# Patient Record
Sex: MALE | Race: BLACK OR AFRICAN AMERICAN | NOT HISPANIC OR LATINO | URBAN - METROPOLITAN AREA
[De-identification: names, ages, dates, MRNs, and addresses within clinical notes are randomized per-mention and may not be internally consistent; named-entity substitution may affect disease eponyms.]

---

## 2019-05-24 ENCOUNTER — EMERGENCY (EMERGENCY)
Facility: HOSPITAL | Age: 44
LOS: 1 days | Discharge: HOME | End: 2019-05-24
Attending: EMERGENCY MEDICINE
Payer: COMMERCIAL

## 2019-05-24 VITALS
OXYGEN SATURATION: 98 % | DIASTOLIC BLOOD PRESSURE: 95 MMHG | SYSTOLIC BLOOD PRESSURE: 138 MMHG | HEIGHT: 71 IN | WEIGHT: 184.09 LBS | HEART RATE: 88 BPM | TEMPERATURE: 98 F | RESPIRATION RATE: 16 BRPM

## 2019-05-24 VITALS
RESPIRATION RATE: 17 BRPM | SYSTOLIC BLOOD PRESSURE: 100 MMHG | OXYGEN SATURATION: 99 % | TEMPERATURE: 97 F | DIASTOLIC BLOOD PRESSURE: 67 MMHG

## 2019-05-24 PROCEDURE — 99284 EMERGENCY DEPT VISIT MOD MDM: CPT | Mod: 25

## 2019-05-24 PROCEDURE — 76870 US EXAM SCROTUM: CPT

## 2019-05-24 PROCEDURE — 99284 EMERGENCY DEPT VISIT MOD MDM: CPT

## 2019-05-24 PROCEDURE — 76870 US EXAM SCROTUM: CPT | Mod: 26

## 2019-05-24 RX ORDER — IBUPROFEN 200 MG
600 TABLET ORAL ONCE
Refills: 0 | Status: COMPLETED | OUTPATIENT
Start: 2019-05-24 | End: 2019-05-24
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
## 2019-05-24 NOTE — ED PROVIDER NOTE - PROGRESS NOTE DETAILS
Testicular US unremarkable, no e/o trauma, only mild hydrocele.  Pt given report.  Advised strict return precautions and PMD f/u.

## 2019-05-24 NOTE — ED ADULT NURSE NOTE - NSIMPLEMENTINTERV_GEN_ALL_ED
Implemented All Universal Safety Interventions:  [place] to call system. Call bell, personal items and telephone within reach. Instruct patient to call for assistance. Room bathroom lighting operational. Non-slip footwear when patient is off stretcher. Physically safe environment: no spills, clutter or unnecessary equipment. Stretcher in lowest position, wheels locked, appropriate side rails in place.

## 2020-01-01 ENCOUNTER — TRANSCRIPTION ENCOUNTER (OUTPATIENT)
Age: 45
End: 2020-01-01

## 2021-02-14 ENCOUNTER — TRANSCRIPTION ENCOUNTER (OUTPATIENT)
Age: 46
End: 2021-02-14

## 2023-09-18 ENCOUNTER — INPATIENT (INPATIENT)
Facility: HOSPITAL | Age: 48
LOS: 0 days | Discharge: HOME | DRG: 309 | End: 2023-09-19
Attending: STUDENT IN AN ORGANIZED HEALTH CARE EDUCATION/TRAINING PROGRAM | Admitting: STUDENT IN AN ORGANIZED HEALTH CARE EDUCATION/TRAINING PROGRAM
Payer: COMMERCIAL

## 2023-09-18 VITALS
TEMPERATURE: 98 F | HEIGHT: 70.87 IN | HEART RATE: 110 BPM | RESPIRATION RATE: 19 BRPM | DIASTOLIC BLOOD PRESSURE: 92 MMHG | SYSTOLIC BLOOD PRESSURE: 168 MMHG | WEIGHT: 211.64 LBS | OXYGEN SATURATION: 98 %

## 2023-09-18 PROCEDURE — 93010 ELECTROCARDIOGRAM REPORT: CPT

## 2023-09-18 PROCEDURE — 99285 EMERGENCY DEPT VISIT HI MDM: CPT

## 2023-09-18 RX ORDER — SODIUM CHLORIDE 9 MG/ML
1000 INJECTION INTRAMUSCULAR; INTRAVENOUS; SUBCUTANEOUS ONCE
Refills: 0 | Status: COMPLETED | OUTPATIENT
Start: 2023-09-18 | End: 2023-09-18

## 2023-09-18 NOTE — ED PROVIDER NOTE - OBJECTIVE STATEMENT
47-year-old male, PMH of HTN, (not currently prescribed any medications) presenting with palpitations.  Patient reports symptoms began approximately 1 hour ago.  Feels like his heart is racing and has some chest pain but denies any dizziness, lightheadedness or trouble breathing.  Reports he had a similar episode years ago when he first got the COVID-vaccine but it did not last this long and was not as severe.

## 2023-09-18 NOTE — ED PROVIDER NOTE - PROGRESS NOTE DETAILS
Boy CRUZ: Received sign out from Dr. MAXI Le.  labs wnl.  MAR and hospitalist endorsed. Pt agrees with admission for cardio consult.  I had a detailed discussion with the patient and/or guardian regarding the historical points, exam findings, and any diagnostic results supporting the admit diagnosis.

## 2023-09-18 NOTE — ED PROVIDER NOTE - PHYSICAL EXAMINATION
General: well appearing male, no acute distress   HEENT: normocephalic, atraumatic   Respiratory: normal work of breathing  Cardiac: tachycardic, irregularly irregular    Abdomen: soft, non-tender, no guarding or rebound   MSK: no swelling or tenderness of lower extremities, moving all extremities spontaneously   Skin: warm, dry   Neuro: A&Ox3  Psych: appropriate affect

## 2023-09-18 NOTE — ED PROVIDER NOTE - CLINICAL SUMMARY MEDICAL DECISION MAKING FREE TEXT BOX
47-year-old male presenting with chest pain and palpitations.  Patient denies any recent illnesses and has been eating and drinking normally.  EKG shows A-fib.  Symptoms likely secondary to new onset atrial fibrillation.  Will get labs to assess for endorgan damage.

## 2023-09-19 ENCOUNTER — TRANSCRIPTION ENCOUNTER (OUTPATIENT)
Age: 48
End: 2023-09-19

## 2023-09-19 VITALS
SYSTOLIC BLOOD PRESSURE: 138 MMHG | HEART RATE: 65 BPM | OXYGEN SATURATION: 97 % | DIASTOLIC BLOOD PRESSURE: 90 MMHG | TEMPERATURE: 98 F | RESPIRATION RATE: 18 BRPM

## 2023-09-19 DIAGNOSIS — I48.91 UNSPECIFIED ATRIAL FIBRILLATION: ICD-10-CM

## 2023-09-19 DIAGNOSIS — I10 ESSENTIAL (PRIMARY) HYPERTENSION: ICD-10-CM

## 2023-09-19 DIAGNOSIS — Z29.9 ENCOUNTER FOR PROPHYLACTIC MEASURES, UNSPECIFIED: ICD-10-CM

## 2023-09-19 DIAGNOSIS — N17.9 ACUTE KIDNEY FAILURE, UNSPECIFIED: ICD-10-CM

## 2023-09-19 LAB
A1C WITH ESTIMATED AVERAGE GLUCOSE RESULT: 5.5 % — SIGNIFICANT CHANGE UP (ref 4–5.6)
ALBUMIN SERPL ELPH-MCNC: 3.6 G/DL — SIGNIFICANT CHANGE UP (ref 3.5–5)
ALBUMIN SERPL ELPH-MCNC: 3.6 G/DL — SIGNIFICANT CHANGE UP (ref 3.5–5)
ALP SERPL-CCNC: 81 U/L — SIGNIFICANT CHANGE UP (ref 40–120)
ALP SERPL-CCNC: 83 U/L — SIGNIFICANT CHANGE UP (ref 40–120)
ALT FLD-CCNC: 42 U/L DA — SIGNIFICANT CHANGE UP (ref 10–60)
ALT FLD-CCNC: 42 U/L DA — SIGNIFICANT CHANGE UP (ref 10–60)
AMPHET UR-MCNC: NEGATIVE — SIGNIFICANT CHANGE UP
ANION GAP SERPL CALC-SCNC: 7 MMOL/L — SIGNIFICANT CHANGE UP (ref 5–17)
ANION GAP SERPL CALC-SCNC: 8 MMOL/L — SIGNIFICANT CHANGE UP (ref 5–17)
APPEARANCE UR: CLEAR — SIGNIFICANT CHANGE UP
AST SERPL-CCNC: 28 U/L — SIGNIFICANT CHANGE UP (ref 10–40)
AST SERPL-CCNC: 33 U/L — SIGNIFICANT CHANGE UP (ref 10–40)
BARBITURATES UR SCN-MCNC: NEGATIVE — SIGNIFICANT CHANGE UP
BASOPHILS # BLD AUTO: 0.01 K/UL — SIGNIFICANT CHANGE UP (ref 0–0.2)
BASOPHILS NFR BLD AUTO: 0.2 % — SIGNIFICANT CHANGE UP (ref 0–2)
BENZODIAZ UR-MCNC: NEGATIVE — SIGNIFICANT CHANGE UP
BILIRUB SERPL-MCNC: 0.6 MG/DL — SIGNIFICANT CHANGE UP (ref 0.2–1.2)
BILIRUB SERPL-MCNC: 0.6 MG/DL — SIGNIFICANT CHANGE UP (ref 0.2–1.2)
BILIRUB UR-MCNC: NEGATIVE — SIGNIFICANT CHANGE UP
BUN SERPL-MCNC: 15 MG/DL — SIGNIFICANT CHANGE UP (ref 7–18)
BUN SERPL-MCNC: 16 MG/DL — SIGNIFICANT CHANGE UP (ref 7–18)
CALCIUM SERPL-MCNC: 8.8 MG/DL — SIGNIFICANT CHANGE UP (ref 8.4–10.5)
CALCIUM SERPL-MCNC: 8.9 MG/DL — SIGNIFICANT CHANGE UP (ref 8.4–10.5)
CHLORIDE SERPL-SCNC: 107 MMOL/L — SIGNIFICANT CHANGE UP (ref 96–108)
CHLORIDE SERPL-SCNC: 107 MMOL/L — SIGNIFICANT CHANGE UP (ref 96–108)
CHOLEST SERPL-MCNC: 185 MG/DL — SIGNIFICANT CHANGE UP
CO2 SERPL-SCNC: 25 MMOL/L — SIGNIFICANT CHANGE UP (ref 22–31)
CO2 SERPL-SCNC: 28 MMOL/L — SIGNIFICANT CHANGE UP (ref 22–31)
COCAINE METAB.OTHER UR-MCNC: NEGATIVE — SIGNIFICANT CHANGE UP
COLOR SPEC: YELLOW — SIGNIFICANT CHANGE UP
CREAT ?TM UR-MCNC: 93 MG/DL — SIGNIFICANT CHANGE UP
CREAT SERPL-MCNC: 1.42 MG/DL — HIGH (ref 0.5–1.3)
CREAT SERPL-MCNC: 1.43 MG/DL — HIGH (ref 0.5–1.3)
DIFF PNL FLD: NEGATIVE — SIGNIFICANT CHANGE UP
EGFR: 62 ML/MIN/1.73M2 — SIGNIFICANT CHANGE UP
EGFR: 62 ML/MIN/1.73M2 — SIGNIFICANT CHANGE UP
EOSINOPHIL # BLD AUTO: 0.09 K/UL — SIGNIFICANT CHANGE UP (ref 0–0.5)
EOSINOPHIL NFR BLD AUTO: 2.1 % — SIGNIFICANT CHANGE UP (ref 0–6)
ESTIMATED AVERAGE GLUCOSE: 111 MG/DL — SIGNIFICANT CHANGE UP (ref 68–114)
GLUCOSE SERPL-MCNC: 110 MG/DL — HIGH (ref 70–99)
GLUCOSE SERPL-MCNC: 111 MG/DL — HIGH (ref 70–99)
GLUCOSE UR QL: NEGATIVE MG/DL — SIGNIFICANT CHANGE UP
HCT VFR BLD CALC: 40.7 % — SIGNIFICANT CHANGE UP (ref 39–50)
HCT VFR BLD CALC: 41.5 % — SIGNIFICANT CHANGE UP (ref 39–50)
HDLC SERPL-MCNC: 60 MG/DL — SIGNIFICANT CHANGE UP
HGB BLD-MCNC: 13.9 G/DL — SIGNIFICANT CHANGE UP (ref 13–17)
HGB BLD-MCNC: 14.3 G/DL — SIGNIFICANT CHANGE UP (ref 13–17)
IMM GRANULOCYTES NFR BLD AUTO: 0.2 % — SIGNIFICANT CHANGE UP (ref 0–0.9)
KETONES UR-MCNC: NEGATIVE MG/DL — SIGNIFICANT CHANGE UP
LEUKOCYTE ESTERASE UR-ACNC: NEGATIVE — SIGNIFICANT CHANGE UP
LIDOCAIN IGE QN: 95 U/L — HIGH (ref 13–75)
LIPID PNL WITH DIRECT LDL SERPL: 112 MG/DL — HIGH
LYMPHOCYTES # BLD AUTO: 1.01 K/UL — SIGNIFICANT CHANGE UP (ref 1–3.3)
LYMPHOCYTES # BLD AUTO: 23.7 % — SIGNIFICANT CHANGE UP (ref 13–44)
MAGNESIUM SERPL-MCNC: 2 MG/DL — SIGNIFICANT CHANGE UP (ref 1.6–2.6)
MAGNESIUM SERPL-MCNC: 2 MG/DL — SIGNIFICANT CHANGE UP (ref 1.6–2.6)
MCHC RBC-ENTMCNC: 29.2 PG — SIGNIFICANT CHANGE UP (ref 27–34)
MCHC RBC-ENTMCNC: 29.2 PG — SIGNIFICANT CHANGE UP (ref 27–34)
MCHC RBC-ENTMCNC: 34.2 GM/DL — SIGNIFICANT CHANGE UP (ref 32–36)
MCHC RBC-ENTMCNC: 34.5 GM/DL — SIGNIFICANT CHANGE UP (ref 32–36)
MCV RBC AUTO: 84.9 FL — SIGNIFICANT CHANGE UP (ref 80–100)
MCV RBC AUTO: 85.5 FL — SIGNIFICANT CHANGE UP (ref 80–100)
METHADONE UR-MCNC: NEGATIVE — SIGNIFICANT CHANGE UP
MONOCYTES # BLD AUTO: 0.41 K/UL — SIGNIFICANT CHANGE UP (ref 0–0.9)
MONOCYTES NFR BLD AUTO: 9.6 % — SIGNIFICANT CHANGE UP (ref 2–14)
NEUTROPHILS # BLD AUTO: 2.74 K/UL — SIGNIFICANT CHANGE UP (ref 1.8–7.4)
NEUTROPHILS NFR BLD AUTO: 64.2 % — SIGNIFICANT CHANGE UP (ref 43–77)
NITRITE UR-MCNC: NEGATIVE — SIGNIFICANT CHANGE UP
NON HDL CHOLESTEROL: 125 MG/DL — SIGNIFICANT CHANGE UP
NRBC # BLD: 0 /100 WBCS — SIGNIFICANT CHANGE UP (ref 0–0)
NRBC # BLD: 0 /100 WBCS — SIGNIFICANT CHANGE UP (ref 0–0)
OPIATES UR-MCNC: NEGATIVE — SIGNIFICANT CHANGE UP
PCP SPEC-MCNC: SIGNIFICANT CHANGE UP
PCP UR-MCNC: NEGATIVE — SIGNIFICANT CHANGE UP
PH UR: 6.5 — SIGNIFICANT CHANGE UP (ref 5–8)
PHOSPHATE SERPL-MCNC: 2.5 MG/DL — SIGNIFICANT CHANGE UP (ref 2.5–4.5)
PLATELET # BLD AUTO: 220 K/UL — SIGNIFICANT CHANGE UP (ref 150–400)
PLATELET # BLD AUTO: 221 K/UL — SIGNIFICANT CHANGE UP (ref 150–400)
POTASSIUM SERPL-MCNC: 3.9 MMOL/L — SIGNIFICANT CHANGE UP (ref 3.5–5.3)
POTASSIUM SERPL-MCNC: 4.3 MMOL/L — SIGNIFICANT CHANGE UP (ref 3.5–5.3)
POTASSIUM SERPL-SCNC: 3.9 MMOL/L — SIGNIFICANT CHANGE UP (ref 3.5–5.3)
POTASSIUM SERPL-SCNC: 4.3 MMOL/L — SIGNIFICANT CHANGE UP (ref 3.5–5.3)
PROT SERPL-MCNC: 7 G/DL — SIGNIFICANT CHANGE UP (ref 6–8.3)
PROT SERPL-MCNC: 7 G/DL — SIGNIFICANT CHANGE UP (ref 6–8.3)
PROT UR-MCNC: NEGATIVE MG/DL — SIGNIFICANT CHANGE UP
RBC # BLD: 4.76 M/UL — SIGNIFICANT CHANGE UP (ref 4.2–5.8)
RBC # BLD: 4.89 M/UL — SIGNIFICANT CHANGE UP (ref 4.2–5.8)
RBC # FLD: 12.5 % — SIGNIFICANT CHANGE UP (ref 10.3–14.5)
RBC # FLD: 12.5 % — SIGNIFICANT CHANGE UP (ref 10.3–14.5)
SODIUM SERPL-SCNC: 140 MMOL/L — SIGNIFICANT CHANGE UP (ref 135–145)
SODIUM SERPL-SCNC: 142 MMOL/L — SIGNIFICANT CHANGE UP (ref 135–145)
SODIUM UR-SCNC: 119 MMOL/L — SIGNIFICANT CHANGE UP
SP GR SPEC: 1.01 — SIGNIFICANT CHANGE UP (ref 1–1.03)
THC UR QL: NEGATIVE — SIGNIFICANT CHANGE UP
TRIGL SERPL-MCNC: 67 MG/DL — SIGNIFICANT CHANGE UP
TROPONIN I, HIGH SENSITIVITY RESULT: 14.6 NG/L — SIGNIFICANT CHANGE UP
TROPONIN I, HIGH SENSITIVITY RESULT: 9.1 NG/L — SIGNIFICANT CHANGE UP
TSH SERPL-MCNC: 1.97 UU/ML — SIGNIFICANT CHANGE UP (ref 0.34–4.82)
UROBILINOGEN FLD QL: 0.2 MG/DL — SIGNIFICANT CHANGE UP (ref 0.2–1)
UUN UR-MCNC: 562 MG/DL — SIGNIFICANT CHANGE UP
WBC # BLD: 4.27 K/UL — SIGNIFICANT CHANGE UP (ref 3.8–10.5)
WBC # BLD: 4.36 K/UL — SIGNIFICANT CHANGE UP (ref 3.8–10.5)
WBC # FLD AUTO: 4.27 K/UL — SIGNIFICANT CHANGE UP (ref 3.8–10.5)
WBC # FLD AUTO: 4.36 K/UL — SIGNIFICANT CHANGE UP (ref 3.8–10.5)

## 2023-09-19 PROCEDURE — 96360 HYDRATION IV INFUSION INIT: CPT

## 2023-09-19 PROCEDURE — 84300 ASSAY OF URINE SODIUM: CPT

## 2023-09-19 PROCEDURE — 83735 ASSAY OF MAGNESIUM: CPT

## 2023-09-19 PROCEDURE — 99285 EMERGENCY DEPT VISIT HI MDM: CPT

## 2023-09-19 PROCEDURE — 80307 DRUG TEST PRSMV CHEM ANLYZR: CPT

## 2023-09-19 PROCEDURE — 36415 COLL VENOUS BLD VENIPUNCTURE: CPT

## 2023-09-19 PROCEDURE — 99223 1ST HOSP IP/OBS HIGH 75: CPT

## 2023-09-19 PROCEDURE — 93306 TTE W/DOPPLER COMPLETE: CPT

## 2023-09-19 PROCEDURE — 83690 ASSAY OF LIPASE: CPT

## 2023-09-19 PROCEDURE — 82570 ASSAY OF URINE CREATININE: CPT

## 2023-09-19 PROCEDURE — 71046 X-RAY EXAM CHEST 2 VIEWS: CPT

## 2023-09-19 PROCEDURE — 93005 ELECTROCARDIOGRAM TRACING: CPT

## 2023-09-19 PROCEDURE — 84443 ASSAY THYROID STIM HORMONE: CPT

## 2023-09-19 PROCEDURE — 85027 COMPLETE CBC AUTOMATED: CPT

## 2023-09-19 PROCEDURE — 84484 ASSAY OF TROPONIN QUANT: CPT

## 2023-09-19 PROCEDURE — 71046 X-RAY EXAM CHEST 2 VIEWS: CPT | Mod: 26

## 2023-09-19 PROCEDURE — 81003 URINALYSIS AUTO W/O SCOPE: CPT

## 2023-09-19 PROCEDURE — 84100 ASSAY OF PHOSPHORUS: CPT

## 2023-09-19 PROCEDURE — 80053 COMPREHEN METABOLIC PANEL: CPT

## 2023-09-19 PROCEDURE — 80061 LIPID PANEL: CPT

## 2023-09-19 PROCEDURE — 85025 COMPLETE CBC W/AUTO DIFF WBC: CPT

## 2023-09-19 PROCEDURE — 83036 HEMOGLOBIN GLYCOSYLATED A1C: CPT

## 2023-09-19 PROCEDURE — 84540 ASSAY OF URINE/UREA-N: CPT

## 2023-09-19 RX ORDER — METOPROLOL TARTRATE 50 MG
1 TABLET ORAL
Qty: 30 | Refills: 0
Start: 2023-09-19 | End: 2023-10-18

## 2023-09-19 RX ORDER — HEPARIN SODIUM 5000 [USP'U]/ML
5000 INJECTION INTRAVENOUS; SUBCUTANEOUS THREE TIMES A DAY
Refills: 0 | Status: DISCONTINUED | OUTPATIENT
Start: 2023-09-19 | End: 2023-09-19

## 2023-09-19 RX ORDER — METOPROLOL TARTRATE 50 MG
12.5 TABLET ORAL
Refills: 0 | Status: DISCONTINUED | OUTPATIENT
Start: 2023-09-19 | End: 2023-09-19

## 2023-09-19 RX ORDER — METOPROLOL TARTRATE 50 MG
25 TABLET ORAL DAILY
Refills: 0 | Status: DISCONTINUED | OUTPATIENT
Start: 2023-09-20 | End: 2023-09-19

## 2023-09-19 RX ORDER — ASPIRIN/CALCIUM CARB/MAGNESIUM 324 MG
81 TABLET ORAL DAILY
Refills: 0 | Status: DISCONTINUED | OUTPATIENT
Start: 2023-09-19 | End: 2023-09-19

## 2023-09-19 RX ORDER — ASPIRIN/CALCIUM CARB/MAGNESIUM 324 MG
1 TABLET ORAL
Qty: 30 | Refills: 0
Start: 2023-09-19 | End: 2023-10-18

## 2023-09-19 RX ORDER — HEPARIN SODIUM 5000 [USP'U]/ML
5000 INJECTION INTRAVENOUS; SUBCUTANEOUS EVERY 8 HOURS
Refills: 0 | Status: DISCONTINUED | OUTPATIENT
Start: 2023-09-19 | End: 2023-09-19

## 2023-09-19 RX ADMIN — Medication 12.5 MILLIGRAM(S): at 17:10

## 2023-09-19 RX ADMIN — HEPARIN SODIUM 5000 UNIT(S): 5000 INJECTION INTRAVENOUS; SUBCUTANEOUS at 13:22

## 2023-09-19 RX ADMIN — SODIUM CHLORIDE 1000 MILLILITER(S): 9 INJECTION INTRAMUSCULAR; INTRAVENOUS; SUBCUTANEOUS at 02:39

## 2023-09-19 RX ADMIN — SODIUM CHLORIDE 1000 MILLILITER(S): 9 INJECTION INTRAMUSCULAR; INTRAVENOUS; SUBCUTANEOUS at 03:36

## 2023-09-19 NOTE — H&P ADULT - ATTENDING COMMENTS
Vital Signs Last 24 Hrs  T(C): 36.4 (18 Sep 2023 23:34), Max: 36.4 (18 Sep 2023 23:34)  T(F): 97.6 (18 Sep 2023 23:34), Max: 97.6 (18 Sep 2023 23:34)  HR: 102 (18 Sep 2023 23:38) (102 - 110)  BP: 168/92 (18 Sep 2023 23:34) (168/92 - 168/92)  RR: 19 (18 Sep 2023 23:34) (19 - 19)  SpO2: 98% (18 Sep 2023 23:34) (98% - 98%)  Parameters below as of 18 Sep 2023 23:34  Patient On (Oxygen Delivery Method): room air    Labs  labs unremarkable except  BUN/Cr - 15 /1.42  TSH - 1.97    CXR - unremarkable     EKG - atrial fibrillation with RVR    Impression   47 year old man with PMH of HTN not on any medications here on account of palpitations diagnosed with acute onset atrial fibrillation with  RVR.    A/P   - Atrial  fibrillation with RVR  Work up for hyperthyroidism is unremarkable   Admit to telemetry   Serial troponin and EKG   ECHO   Cardiology consult   chads vasc score - 1    - Elevated renal indices   likely ROLF ; r/o underlying CKD  check FeNA, renal U/S    - HTN   Untreated  Would need medication--> BB vs non dihydropyridine CCB

## 2023-09-19 NOTE — H&P ADULT - ASSESSMENT
47-year-old male, ambulates independently , ANOx3 PMH of  migraines, HTN, (not currently prescribed any medications) presenting with palpitations.  Patient reports symptoms began approximately 10:30 Pm after bending down. He felt that his heart rate was irregular and fast with no relief.  Given bolus in ED. BP was 168/92 but came down to 139/77. EKG showed Afib with RVR and a HR of 102. Admit for new onset afib.  47-year-old male, ambulates independently , ANOx3 PMH of  migraines, HTN, (not currently prescribed any medications) presenting with palpitations a/w chest tightness also reported to have elevated home BP during episode. In the ED found to be tachycardic  and hypertensive 168/92 > 139/77. Given bolus in ED, no additional meds given. EKG showed Afib with RVR and a HR of 102. Trop1 neg 9.1. TSH wnl. Admitted to tele for new onset afib and HTN management.

## 2023-09-19 NOTE — DISCHARGE NOTE PROVIDER - CARE PROVIDER_API CALL
Javier Kelly  Cardiology  [address] [place] [address]  Phone: [phone]  Fax: [phone]  Follow Up Time: 1 week

## 2023-09-19 NOTE — H&P ADULT - PROBLEM SELECTOR PLAN 2
h/o HTN not currently on any medications, admits to non-compliance with amlodipine 10mg; no pcp  /92 > 139/77 without any intervention   will start Lopressor 12.5mg BID with holding parameters for now vs carvedilol   Monitor BP h/o HTN not currently on any medications, admits to non-compliance with amlodipine 10mg; no pcp  /92 > 139/77 without any intervention   will start Lopressor 12.5mg BID with holding parameters for now   Monitor BP

## 2023-09-19 NOTE — CONSULT NOTE ADULT - ASSESSMENT
47-year-old male, PMHx migraines, HTN (not currently prescribed any medications) presenting with palpitations a/w chest tightness also reported to have elevated home BP during episode. In the ED found to be tachycardic  and hypertensive 168/92 > 139/77. Given bolus in ED, no additional meds given. EKG showed Afib with RVR and a HR of 102. Admitted to tele for new onset Afib and HTN management.    #Afib with RVR   #HTN   #ROLF       FULL NOTE TO FOLLOW   47-year-old male w PMHx migraines, HTN (not currently prescribed any medications) presenting with palpitations a/w chest tightness also reported to have elevated home BP during episode. In the ED found to be tachycardic  and hypertensive 168/92 > 139/77. Given bolus in ED, no additional meds given. EKG showed Afib with RVR and a HR of 102. Trop1 neg 9.1. TSH wnl. Admitted to tele for new onset afib and HTN management.  47-year-old male w PMHx migraines, HTN (not currently prescribed any medications) presenting with palpitations a/w chest tightness also reported to have elevated home BP during episode. In the ED found to be tachycardic  and hypertensive 168/92 > 139/77. EKG showed Afib with RVR and a HR of 102. Admitted to tele for new onset Afib and HTN management.

## 2023-09-19 NOTE — H&P ADULT - PROBLEM SELECTOR PLAN 3
p/w SCr 1.42 on admission  Baseline SCr - unknown   likely in the setting of poorly controlled BP vs new- onset afib   s/p 1L NS x1 in the ED   f/u Urine Lytes, calculate FeNa  avoid NSAIDs and nephrotoxic agents   monitor CMP daily

## 2023-09-19 NOTE — DISCHARGE NOTE PROVIDER - NSDCCPCAREPLAN_GEN_ALL_CORE_FT
PRINCIPAL DISCHARGE DIAGNOSIS  Diagnosis: Atrial fibrillation  Assessment and Plan of Treatment: You presented with palpiations and was found to have new onset atrila fibrillation- (abnormal heart rythm). You were evaluated by cardiologist Dr. Kelly- started on metoprolol and aspirin   ECHO was performed- grossly unremarakble on  prelim read.   Please follow with Dr. Kelly in a weeks time. Dr Kelly can further inform you about ECHO results as well.      SECONDARY DISCHARGE DIAGNOSES  Diagnosis: Uncontrolled hypertension  Assessment and Plan of Treatment: You have hx of high blood pressure, please continue taking amlodpine- would decrease dose to 5mg OD and metoprolol. F/u with cardiologist    Diagnosis: ROLF (acute kidney injury)  Assessment and Plan of Treatment: You were noted with ROLF- Creatinine was stable, could be secondary to uncontrolled hypertension. Please follow ith PCP     PRINCIPAL DISCHARGE DIAGNOSIS  Diagnosis: Atrial fibrillation  Assessment and Plan of Treatment: You presented with palpiations and was found to have new onset atrila fibrillation- (abnormal heart rythm). You were evaluated by cardiologist Dr. Kelly- started on metoprolol and aspirin   ECHO was performed- grossly unremarakble on  prelim read.   Please follow with Dr. Kelly in a weeks time. Dr Kelly can further inform you about ECHO results as well.      SECONDARY DISCHARGE DIAGNOSES  Diagnosis: Uncontrolled hypertension  Assessment and Plan of Treatment: You have hx of high blood pressure, please start taking metoprolol. F/u with cardiologist, may need a second medication if BP remains elevated.    Diagnosis: ROLF (acute kidney injury)  Assessment and Plan of Treatment: You were noted with ROLF- Creatinine was stable, could be secondary to uncontrolled hypertension. Please follow ith PCP

## 2023-09-19 NOTE — CONSULT NOTE ADULT - SUBJECTIVE AND OBJECTIVE BOX
Patient is a 47y old  Male who presents with a chief complaint of Palpitations (19 Sep 2023 05:33)      HPI:   47-year-old male, ambulates independently , ANOx3 PMHx  migraines and HTN, (not currently prescribed any medications) presenting with palpitations.  Patient reports symptoms began approximately 10:30 PM after bending down where he felt that his heart rate was irregular and fast with no relief. Reports that palpitations was associated with slight chest tightness, denies shortness of breath or cough. Admits that he took blood pressure at home and the systolic ranged from 180 -140 and reports taking Amlodipine 10mg (non-compliant, takes as needed) from an old prescription as he thought the blood pressure may be causing the palpitations and slight chest tightness. Admits to slight HA earlier in the day and took an Advil with relief. Denies any dizziness, lightheadedness, SOB, abdominal pain, N/V.  Reports he had a similar episode years ago when he first got the COVID -vaccine but it did not last this long and was not as severe. Recent travel to [place] and landed back in [place] on 9/9/23 but denies leg swelling or sob. No recent infection, abx use, or known sick contact.   (19 Sep 2023 05:33)      PAST MEDICAL & SURGICAL HISTORY:  HTN (hypertension)      No significant past surgical history          SOCIAL HX:   Smoking                         ETOH                            Other    FAMILY HISTORY:  FH: HTN (hypertension) (Mother)    :  No known cardiovascular family history    ROS:  See HPI     Allergies    No Known Allergies    Intolerances        MEDICATIONS:    PHYSICAL EXAM:    T(C): 36.8 (09-19-23 @ 07:28), Max: 36.8 (09-19-23 @ 07:28)  HR: 77 (09-19-23 @ 07:28) (77 - 110)  BP: 145/95 (09-19-23 @ 07:28) (139/77 - 168/92)  RR: 18 (09-19-23 @ 07:28) (18 - 19)  SpO2: 97% (09-19-23 @ 07:28) (96% - 98%)    GENERAL: patient appears well, NAD, pleasant  HEAD: normocephalic, atraumatic  EYES: sclera clear, no exudates  ENMT: oropharynx clear without erythema, no exudates, moist mucous membranes  NECK: supple, soft, no thyromegaly noted  LUNGS: clear to auscultation bilaterally, no wheezing, rhonchi or rales appreciated  HEART: S1/S2, regular rate and rhythm, no murmurs noted, no lower extremity edema  GASTROINTESTINAL: abdomen is soft, nondistended, nontender, normoactive bowel sounds, no palpable masses  INTEGUMENT: good skin turgor, no lesions noted  MUSCULOSKELETAL: no clubbing or cyanosis, no obvious deformity  NEUROLOGIC: AAO x3, no obvious sensorimotor deficits noted      LABS:                          14.3   4.36  )-----------( 221      ( 19 Sep 2023 06:32 )             41.5                                               09-19    140  |  107  |  16  ----------------------------<  110<H>  4.3   |  25  |  1.43<H>    Ca    8.9      19 Sep 2023 06:32  Phos  2.5     09-19  Mg     2.0     09-19    TPro  7.0  /  Alb  3.6  /  TBili  0.6  /  DBili  x   /  AST  33  /  ALT  42  /  AlkPhos  83  09-19                                             Urinalysis Basic - ( 19 Sep 2023 06:32 )    Color: x / Appearance: x / SG: x / pH: x  Gluc: 110 mg/dL / Ketone: x  / Bili: x / Urobili: x   Blood: x / Protein: x / Nitrite: x   Leuk Esterase: x / RBC: x / WBC x   Sq Epi: x / Non Sq Epi: x / Bacteria: x                                                  LIVER FUNCTIONS - ( 19 Sep 2023 06:32 )  Alb: 3.6 g/dL / Pro: 7.0 g/dL / ALK PHOS: 83 U/L / ALT: 42 U/L DA / AST: 33 U/L / GGT: x                                             IMAGING:      ACC: [id number] EXAM:  XR CHEST PA LAT 2V   ORDERED BY: IRA SKY     PROCEDURE DATE:  09/19/2023          INTERPRETATION:  EXAM: XR CHEST PA AND LATERAL    INDICATION: Chest Pain HXR    COMPARISON: December 31, 2019    IMPRESSION: No acute infiltrate or congestion. Heart is within normal   limits in its transthoracic diameter. Regional osseous structures   appropriate for age    --- End of Report ---        PARDEEP MINER MD; Attending Radiologist  This document has been electronically signed. Sep 19 2023  8:55AM   Patient is a 47y old  Male who presents with a chief complaint of Palpitations (19 Sep 2023 09:44)      HPI:   47-year-old male, ambulates independently , ANOx3 PMHx  migraines and HTN, (not currently prescribed any medications) presenting with palpitations.  Patient reports symptoms began approximately 10:30 PM after bending down where he felt that his heart rate was irregular and fast with no relief. Reports that palpitations was associated with slight chest tightness, denies shortness of breath or cough. Admits that he took blood pressure at home and the systolic ranged from 180 -140 and reports taking Amlodipine 10mg (non-compliant, takes as needed) from an old prescription as he thought the blood pressure may be causing the palpitations and slight chest tightness. Admits to slight HA earlier in the day and took an Advil with relief. Denies any dizziness, lightheadedness, SOB, abdominal pain, N/V.  Reports he had a similar episode years ago when he first got the COVID -vaccine but it did not last this long and was not as severe. Recent travel to [place] and landed back [place] on [place] but denies leg swelling or sob. No recent infection, abx use, or known sick contact.   (19 Sep 2023 05:33)      PAST MEDICAL & SURGICAL HISTORY:  HTN (hypertension)      No significant past surgical history       PREVIOUS DIAGNOSTIC TESTING:      ECHO  FINDINGS:    STRESS  FINDINGS:    CATHETERIZATION  FINDINGS:    MEDICATIONS  (STANDING):  heparin   Injectable 5000 Unit(s) IV Push three times a day  metoprolol tartrate 12.5 milliGRAM(s) Oral two times a day    MEDICATIONS  (PRN):      FAMILY HISTORY:  FH: HTN (hypertension) (Mother)        SOCIAL HISTORY: none    CIGARETTES: none    ALCOHOL: social    Vital Signs Last 24 Hrs  T(C): 36.4 (19 Sep 2023 11:21), Max: 36.8 (19 Sep 2023 07:28)  T(F): 97.5 (19 Sep 2023 11:21), Max: 98.2 (19 Sep 2023 07:28)  HR: 72 (19 Sep 2023 11:21) (72 - 110)  BP: 149/92 (19 Sep 2023 11:21) (139/77 - 168/92)  BP(mean): --  RR: 18 (19 Sep 2023 11:21) (18 - 19)  SpO2: 97% (19 Sep 2023 11:21) (96% - 98%)    Parameters below as of 19 Sep 2023 11:21  Patient On (Oxygen Delivery Method): room air      PHYSICAL EXAMINATION:  .Constitutional	Well-developed, well nourished  · Eyes	EOMI; PERRL; no drainage or redness  · ENMT	No oral lesions; no gross abnormalities  · Neck	No bruits; no thyromegaly or nodules  · Breasts	No masses; no nipple discharge  · Back	No deformity or limitation of movement  · Respiratory	detailed exam  · Respiratory Details	normal; airway patent; breath sounds equal; good air movement; respirations non-labored; clear to auscultation bilaterally; no chest wall tenderness; no intercostal retractions; no rales  · Cardiovascular	detailed exam  · Cardiovascular Details	regular rate and rhythm  no rub  no murmur  normal PMI  · Gastrointestinal	detailed exam  · GI Normal	normal; soft; nontender; no distention; no masses palpable; bowel sounds normal; no rebound tenderness; no guarding; no rigidity; no organomegaly  · Extremities	No cyanosis, clubbing or edema  · Vascular	Equal and normal pulses (carotid, femoral, dorsalis pedis)  · Neurological	detailed exam  · Neurological Details	alert and oriented x 3; sensation intact; deep reflexes intact; cranial nerves intact; no spontaneous movement; superficial reflexes intact; normal strength  · Mental Status	AAO x 3  · Cranial Nerve	CN 2-12 intact  · Motor	Power 5/5 bl  · Sensory	intact  · Gait/Balance	Unable to demonstrate  · Neurological Comments	No nystagmus  · Skin	No lesions; no rash  · Lymph Nodes	No lymphadedenopathy  · Musculoskeletal	 full range of motion    ECG: Atrial fibrillation with RVR to 102    CHADSVASC: 1    I&O's Detail      LABS:                        14.3   4.36  )-----------( 221      ( 19 Sep 2023 06:32 )             41.5         140  |  107  |  16  ----------------------------<  110<H>  4.3   |  25  |  1.43<H>    Ca    8.9      19 Sep 2023 06:32  Phos  2.5       Mg     2.0         TPro  7.0  /  Alb  3.6  /  TBili  0.6  /  DBili  x   /  AST  33  /  ALT  42  /  AlkPhos  83            Urinalysis Basic - ( 19 Sep 2023 10:49 )    Color: Yellow / Appearance: Clear / S.013 / pH: x  Gluc: x / Ketone: Negative mg/dL  / Bili: Negative / Urobili: 0.2 mg/dL   Blood: x / Protein: Negative mg/dL / Nitrite: Negative   Leuk Esterase: Negative / RBC: x / WBC x   Sq Epi: x / Non Sq Epi: x / Bacteria: x      I&O's Summary      RADIOLOGY & ADDITIONAL STUDIES:      ACC: [id number] EXAM:  XR CHEST PA LAT 2V   ORDERED BY: IRA SKY     PROCEDURE DATE:  2023          INTERPRETATION:  EXAM: XR CHEST PA AND LATERAL    INDICATION: Chest Pain HXR    COMPARISON: 2019    IMPRESSION: No acute infiltrate or congestion. Heart is within normal   limits in its transthoracic diameter. Regional osseous structures   appropriate for age    --- End of Report ---    PARDEEP MINER MD; Attending Radiologist  This document has been electronically signed. Sep 19 2023  8:55AM

## 2023-09-19 NOTE — H&P ADULT - HISTORY OF PRESENT ILLNESS
47-year-old male, ambulates independently , ANOx3 PMH of  migraines, HTN, (not currently prescribed any medications) presenting with palpitations.  Patient reports symptoms began approximately 10:30 Pm after bending down. He felt that his heart rate was irregular and fast with no relief. He took his blood pressure at home and the systolic ranged from 180 to 140. He took an 10 mg Amlodipine from an old prescription as he thought the blood pressure may be causing the palpitations and slight chest tightness.  Admits to slight HA earlier in the day and took an Advil with relief. Denies any dizziness, lightheadedness, SOB, abdominal pain, N/V.  Reports he had a similar episode years ago when he first got the COVID-vaccine but it did not last this long and was not as severe. Recent travel to [place] and [place] back [place] on 9[place]9/23.  No recent infection, abx use, or exposure.   47-year-old male, ambulates independently , ANOx3 PMHx  migraines and HTN, (not currently prescribed any medications) presenting with palpitations.  Patient reports symptoms began approximately 10:30 PM after bending down where he felt that his heart rate was irregular and fast with no relief. Reports that palpitations was associated with slight chest tightness, denies shortness of breath or cough. Admits that he took blood pressure at home and the systolic ranged from 180 -140 and reports taking Amlodipine 10mg (non-compliant, takes as needed) from an old prescription as he thought the blood pressure may be causing the palpitations and slight chest tightness. Admits to slight HA earlier in the day and took an Advil with relief. Denies any dizziness, lightheadedness, SOB, abdominal pain, N/V.  Reports he had a similar episode years ago when he first got the COVID -vaccine but it did not last this long and was not as severe. Recent travel to [place] and [place] back in [place] on 9/9/23 but denies leg swelling or sob. No recent infection, abx use, or known sick contact.

## 2023-09-19 NOTE — H&P ADULT - PROBLEM SELECTOR PLAN 1
no prior episodes or cardiac hx  EKG shows afib w/ RVR   Trop1 neg (9.1)  s/p 1L Bolus in ED  CHADSVASC: 1 point, 0.9% risk of stroke, TIA, systemic embolism; low-moderate risk and may benefit from anti-platelet or AC.   will start ASA for given low-risk   will start Metoprolol 12.5mg BID for rate control, can increase as indicated vs carvedilol   Tele monitoring - Goal rate <110  f/u TTE  f/u Trop2, Pro-BNP  Cardio consulted: no prior episodes or cardiac hx  EKG shows afib w/ RVR   Trop1 neg (9.1)  s/p 1L Bolus in ED  CHADSVASC: 1 point, 0.9% risk of stroke, TIA, systemic embolism; low-moderate risk and may benefit from anti-platelet or AC.   will start ASA for given low-risk   will start Metoprolol 12.5mg BID for rate control, can increase as indicated vs carvedilol   Tele monitoring - Goal rate <110  f/u TTE  f/u Trop2, Pro-BNP  f/u UTox  Cardio consulted: no prior episodes or cardiac hx  EKG shows afib w/ RVR   Trop1 neg (9.1)  s/p 1L Bolus in ED  CHADSVASC: 1 point, 0.9% risk of stroke, TIA, systemic embolism; low-moderate risk and may benefit from anti-platelet or AC.   will start Metoprolol 12.5mg BID for rate control, can increase as indicated  Tele monitoring - Goal rate <110  f/u TTE  f/u Trop2, Pro-BNP  f/u UTox  Cardio consulted: Dr. Kelly no prior episodes or cardiac hx  EKG shows afib w/ RVR   Trop1 neg (9.1)  s/p 1L Bolus in ED  CHADSVASC: 1 point, 0.9% risk of stroke, TIA, systemic embolism; low-moderate risk and may benefit from anti-platelet or AC.   Will hold off on AC at this time, c/w DVT ppx  will start Metoprolol 12.5mg BID for rate control, can increase as indicated  Tele monitoring - Goal rate <110  f/u TTE  f/u Trop2  f/u UTox  Cardio consulted: Dr. Kelly

## 2023-09-19 NOTE — DISCHARGE NOTE PROVIDER - ATTENDING DISCHARGE PHYSICAL EXAMINATION:
Vital Signs Last 24 Hrs  T(C): 36.7 (19 Sep 2023 16:04), Max: 36.8 (19 Sep 2023 07:28)  T(F): 98 (19 Sep 2023 16:04), Max: 98.2 (19 Sep 2023 07:28)  HR: 70 (19 Sep 2023 16:04) (70 - 110)  BP: 150/88 (19 Sep 2023 16:04) (139/77 - 168/92)  RR: 18 (19 Sep 2023 16:04) (18 - 19)  SpO2: 97% (19 Sep 2023 16:04) (96% - 98%)    Parameters below as of 19 Sep 2023 16:04  Patient On (Oxygen Delivery Method): room air    CONSTITUTIONAL: Well appearing, well nourished, awake, alert and in no apparent distress  ENMT: Airway patent, Nasal mucosa clear. Mouth with normal mucosa. Throat has no vesicles, no oropharyngeal exudates and uvula is midline.  EYES: Clear bilaterally, pupils equal, round and reactive to light. EOMI.  CARDIAC: Normal rate, regular rhythm.  Heart sounds S1, S2.  No murmurs, rubs or gallops   RESPIRATORY: Breath sounds clear and equal bilaterally. No wheezes, rhales or rhonchi  MUSCULOSKELETAL: Spine appears normal, range of motion is not limited, no muscle or joint tenderness  EXTREMITIES: No edema, cyanosis or deformity   NEUROLOGICAL: Alert and oriented, no focal deficits, no motor or sensory deficits.  SKIN: No rash, skin turgor

## 2023-09-19 NOTE — H&P ADULT - NSHPPHYSICALEXAM_GEN_ALL_CORE
Vital Signs Last 24 Hrs  T(C): 36.6 (19 Sep 2023 05:51), Max: 36.6 (19 Sep 2023 05:51)  T(F): 97.9 (19 Sep 2023 05:51), Max: 97.9 (19 Sep 2023 05:51)  HR: 92 (19 Sep 2023 05:51) (92 - 110)  BP: 139/77 (19 Sep 2023 05:51) (139/77 - 168/92)  BP(mean): --  RR: 18 (19 Sep 2023 05:51) (18 - 19)  SpO2: 96% (19 Sep 2023 05:51) (96% - 98%)    Parameters below as of 19 Sep 2023 05:51  Patient On (Oxygen Delivery Method): room air    GENERAL: NAD, lying in bed comfortably  HEAD:  Atraumatic, Normocephalic  EYES: EOMI, PERRLA, conjunctiva and sclera clear  ENT: Moist mucous membranes  NECK: Supple, No JVD  CHEST/LUNG: Clear to auscultation bilaterally; No rales, rhonchi, wheezing, or rubs. Unlabored respirations  HEART: Regular rate and rhythm; No murmurs, rubs, or gallops  ABDOMEN: Bowel sounds present; Soft, Nontender, Nondistended. No hepatomegally  EXTREMITIES:  2+ Peripheral Pulses, brisk capillary refill. No clubbing, cyanosis, or edema  NERVOUS SYSTEM:  Alert & Oriented X3, speech clear. No deficits   MSK: FROM all 4 extremities, full and equal strength  SKIN: No rashes or lesions Vital Signs Last 24 Hrs  T(C): 36.6 (19 Sep 2023 05:51), Max: 36.6 (19 Sep 2023 05:51)  T(F): 97.9 (19 Sep 2023 05:51), Max: 97.9 (19 Sep 2023 05:51)  HR: 92 (19 Sep 2023 05:51) (92 - 110)  BP: 139/77 (19 Sep 2023 05:51) (139/77 - 168/92)  BP(mean): --  RR: 18 (19 Sep 2023 05:51) (18 - 19)  SpO2: 96% (19 Sep 2023 05:51) (96% - 98%)    Parameters below as of 19 Sep 2023 05:51  Patient On (Oxygen Delivery Method): room air    GENERAL: NAD, lying in bed comfortably  HEAD:  Atraumatic, Normocephalic  EYES: EOMI, PERRLA, conjunctiva and sclera clear  ENT: Moist mucous membranes  CHEST/LUNG: Clear to auscultation bilaterally; No rales, rhonchi, wheezing, or rubs. Unlabored respirations  HEART: fast and irregular but no murmurs.   ABDOMEN: Bowel sounds present; Soft, Nontender, Nondistended. No hepatomegally  EXTREMITIES:  2+ Peripheral Pulses, brisk capillary refill. No clubbing, cyanosis, or edema  NERVOUS SYSTEM:  Alert & Oriented X3, speech clear. No deficits   MSK: FROM all 4 extremities, full and equal strength  SKIN: No rashes or lesions

## 2023-09-19 NOTE — DISCHARGE NOTE PROVIDER - HOSPITAL COURSE
47-year-old male, ambulates independently , ANOx3 PMHx  migraines and HTN, (not currently prescribed any medications) presenting with palpitations.  Patient reports symptoms began approximately 10:30 PM after bending down where he felt that his heart rate was irregular and fast with no relief. Reports that palpitations was associated with slight chest tightness, denies shortness of breath or cough. Admits that he took blood pressure at home and the systolic ranged from 180 -140 and reports taking Amlodipine 10mg (non-compliant, takes as needed) from an old prescription as he thought the blood pressure may be causing the palpitations and slight chest tightness. Admits to slight HA earlier in the day and took an Advil with relief. Denies any dizziness, lightheadedness, SOB, abdominal pain, N/V.  Reports he had a similar episode years ago when he first got the COVID -vaccine but it did not last this long and was not as severe. Recent travel to [place] and landed back in [place] on 9/9/23 but denies leg swelling or sob. No recent infection, abx use, or known sick contact.      Patient was admitted for new onset atrial fibrillation, ECHO was performed- prelim read was grossly unremarkable. TSH wnl.   He was started on asa and metoprolol after which HR was controlled, patient will get discharged w/ out-patient f/u w/ Dr Kelly     Given patient's improved clinical status and current hemodynamic stability, decision was made to discharge. Discussed with attending  Please refer to patient's complete medical chart with documents for a full hospital course, for this is only a brief summary.

## 2023-09-19 NOTE — DISCHARGE NOTE NURSING/CASE MANAGEMENT/SOCIAL WORK - PATIENT PORTAL LINK FT
You can access the FollowMyHealth Patient Portal offered by [facility] by registering at the following website: [email or web address]. By joining [facility]’s FollowMyHealth portal, you will also be able to view your health information using other applications (apps) compatible with our system.

## 2023-09-19 NOTE — CONSULT NOTE ADULT - PROBLEM SELECTOR RECOMMENDATION 9
pt p/w palpitations  EKG shows afib w/ RVR   Trop neg x2   Monitor on Tele for 24hrs   CHADSVASC: 1 point, 0.9% risk of stroke, TIA, systemic embolism; low-moderate risk  -start aspirin  f/u TTE  -if no structural heart abnormalities, discharge on Toprol XL 25mg qd pt p/w palpitations  EKG shows afib w/ RVR   Trop neg x2, TSH wnl  Monitor on Tele for 24hrs   CHADSVASC: 1 point, 0.9% risk of stroke, TIA, systemic embolism; low-moderate risk  -start aspirin  f/u TTE  -if no structural heart abnormalities, discharge on Toprol XL 25mg qd pt p/w palpitations  EKG shows afib w/ RVR   Trop neg x2, TSH wnl  Now in sinus, likely paroxysmal atrial fibrillation  Monitor on Tele for 24hrs   CHADSVASC: 1 point, 0.9% risk of stroke, TIA, systemic embolism; low-moderate risk  -start aspirin  f/u TTE  -if no structural heart abnormalities, discharge on Toprol XL 25mg qd

## 2023-09-19 NOTE — DISCHARGE NOTE NURSING/CASE MANAGEMENT/SOCIAL WORK - NSDCPEFALRISK_GEN_ALL_CORE
For information on Fall & Injury Prevention, visit: [email or web address] OR  [email or web address] OR  https://www.cdc.gov/steadi/patient.html

## 2023-09-19 NOTE — H&P ADULT - NSHPREVIEWOFSYSTEMS_GEN_ALL_CORE
CONSTITUTIONAL: No fever, chills, or weakness.   EYES/ENT: No visual changes;  No ear pain, runny nose, or sore throat.   NECK: No pain or stiffness.  RESPIRATORY: No cough, wheezing, hemoptysis; No shortness of breath.  CARDIOVASCULAR:  (+) palpitations and slight chest tightness.  GASTROINTESTINAL: No abdominal or epigastric pain. No nausea, vomiting, or hematemesis; No diarrhea or constipation. No melena or hematochezia.  GENITOURINARY: No dysuria, frequency or hematuria.  NEUROLOGICAL: No numbness or weakness.  SKIN: No itching, rashes.

## 2023-09-21 NOTE — ED ADULT NURSE NOTE - ED COMFORT CARE
Confirmed with Dr. Carr that request has been resolved.  She is aware appointment with [facility] is 10/17/2023.     Marisel is a 50 year old who is being evaluated via a billable telephone visit.      What phone number would you like to be contacted at? [phone]  How would you like to obtain your AVS? Katelynn      MIGRAINE DISABILITY ASSESSMENT (MIDAS)    On how many days in the last 3 months did you miss work or school because of your headaches?  4    How many days in the last 3 months was your productivity at work or school reduced by half or more because of your headaches? (Do not include days you counted in question 1 where you missed work or school.)  5    On how many days in the last 3 months did you not do household work (such as housework, home repairs and maintenance, shopping, caring for children and relatives) because of your headaches?  5    How many days in the last 3 months was your productivity in household work reduced by half or more because of your headaches? (Do not include days you counted in question 3 where you did not do household work).  5    On how many days in the last 3 months did you miss family, social, or lesiure activities because of your headaches?  2    MIDAS Total Score:     On how many days in the last 3 months did you have a headache? (If a headache lasted more than 1 day, count each day.)   2    On a scale of 0 - 10, on average how painful were these headaches (where 0 = no pain at all, and 10 = pain as bad as it can be.)  5    Last Patient-Answered HIT-6 Questionnaire  HIT-6 11/26/2021   When you have headaches, how often is the pain severe 11   How often do headaches limit your ability to do usual daily activities including household work, work, school, or social activities? 11   When you have a headache, how often do you wish you could lie down? 13   In the past 4 weeks, how often have you felt too tired to do work or daily activities because of your headaches 8   In the past 4 weeks, how often have you felt fed up or irritated because of your headaches 13   In the past 4 weeks, how often  did headaches limit your ability to concentrate on work or daily activities 8   HIT-6 Total Score 64     Provider's telephone note:  Propranolol has been working at 20 mg at bedtime and no side effects  A whole month may be a couple of headaches.   Rizatriptan helps and feels less weird.   Takes a couple of hours and needs to lay down     Plan:  No changes   Continue propranolol 20 mg at bedtime for now and may increase dose to 20 mg twice daily if headaches were increasing  Rescue treatment -rizatriptan as needed   Follow up in 6 months or sooner if needed     Phone call lasted 8 minutes      MATTI Hudson, CNP [facility]  Headache certified  [facility]               Patient informed

## 2024-04-02 NOTE — ED ADULT NURSE NOTE - NS ED NURSE LEVEL OF CONSCIOUSNESS SPEECH
Addended by: CONSTANTINE ESTRADA on: 4/2/2024 03:53 PM     Modules accepted: Orders    
Speaking Coherently

## 2025-05-07 NOTE — ED ADULT NURSE NOTE - NSFALLCONCLUSION_ED_ALL_ED
Universal Safety Interventions Metronidazole Pregnancy And Lactation Text: This medication is Pregnancy Category B and considered safe during pregnancy.  It is also excreted in breast milk.